# Patient Record
Sex: FEMALE | Race: BLACK OR AFRICAN AMERICAN | NOT HISPANIC OR LATINO | Employment: UNEMPLOYED | URBAN - METROPOLITAN AREA
[De-identification: names, ages, dates, MRNs, and addresses within clinical notes are randomized per-mention and may not be internally consistent; named-entity substitution may affect disease eponyms.]

---
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
## 2022-04-11 ENCOUNTER — HISTORICAL (OUTPATIENT)
Dept: ADMINISTRATIVE | Facility: HOSPITAL | Age: 22
End: 2022-04-11

## 2022-04-25 VITALS
OXYGEN SATURATION: 99 % | SYSTOLIC BLOOD PRESSURE: 144 MMHG | WEIGHT: 158.75 LBS | BODY MASS INDEX: 29.21 KG/M2 | HEIGHT: 62 IN | DIASTOLIC BLOOD PRESSURE: 73 MMHG

## 2023-06-22 ENCOUNTER — HOSPITAL ENCOUNTER (EMERGENCY)
Facility: HOSPITAL | Age: 23
Discharge: HOME | End: 2023-06-22
Attending: INTERNAL MEDICINE
Payer: MEDICAID

## 2023-06-22 VITALS
RESPIRATION RATE: 18 BRPM | SYSTOLIC BLOOD PRESSURE: 112 MMHG | WEIGHT: 184.06 LBS | DIASTOLIC BLOOD PRESSURE: 71 MMHG | HEART RATE: 82 BPM | BODY MASS INDEX: 33.87 KG/M2 | OXYGEN SATURATION: 100 % | HEIGHT: 62 IN | TEMPERATURE: 98 F

## 2023-06-22 DIAGNOSIS — J32.9 SINUSITIS, UNSPECIFIED CHRONICITY, UNSPECIFIED LOCATION: Primary | ICD-10-CM

## 2023-06-22 DIAGNOSIS — R05.9 COUGH: ICD-10-CM

## 2023-06-22 LAB
ALBUMIN SERPL-MCNC: 3.7 G/DL (ref 3.5–5)
ALBUMIN/GLOB SERPL: 1 RATIO (ref 1.1–2)
ALP SERPL-CCNC: 131 UNIT/L (ref 40–150)
ALT SERPL-CCNC: 15 UNIT/L (ref 0–55)
APPEARANCE UR: CLEAR
AST SERPL-CCNC: 13 UNIT/L (ref 5–34)
B-HCG UR QL: NEGATIVE
BACTERIA #/AREA URNS AUTO: ABNORMAL /HPF
BASOPHILS # BLD AUTO: 0.06 X10(3)/MCL
BASOPHILS NFR BLD AUTO: 0.6 %
BILIRUB UR QL STRIP.AUTO: NEGATIVE MG/DL
BILIRUBIN DIRECT+TOT PNL SERPL-MCNC: 0.5 MG/DL
BUN SERPL-MCNC: 10.1 MG/DL (ref 7–18.7)
CALCIUM SERPL-MCNC: 9.8 MG/DL (ref 8.4–10.2)
CHLORIDE SERPL-SCNC: 106 MMOL/L (ref 98–107)
CO2 SERPL-SCNC: 26 MMOL/L (ref 22–29)
COLOR UR: ABNORMAL
CREAT SERPL-MCNC: 0.65 MG/DL (ref 0.55–1.02)
CTP QC/QA: YES
EOSINOPHIL # BLD AUTO: 0.09 X10(3)/MCL (ref 0–0.9)
EOSINOPHIL NFR BLD AUTO: 0.9 %
ERYTHROCYTE [DISTWIDTH] IN BLOOD BY AUTOMATED COUNT: 13.3 % (ref 11.5–17)
GFR SERPLBLD CREATININE-BSD FMLA CKD-EPI: >60 MLS/MIN/1.73/M2
GLOBULIN SER-MCNC: 3.8 GM/DL (ref 2.4–3.5)
GLUCOSE SERPL-MCNC: 97 MG/DL (ref 74–100)
GLUCOSE UR QL STRIP.AUTO: NORMAL MG/DL
HCT VFR BLD AUTO: 37.9 % (ref 37–47)
HGB BLD-MCNC: 12.4 G/DL (ref 12–16)
HYALINE CASTS #/AREA URNS LPF: ABNORMAL /LPF
IMM GRANULOCYTES # BLD AUTO: 0.02 X10(3)/MCL (ref 0–0.04)
IMM GRANULOCYTES NFR BLD AUTO: 0.2 %
KETONES UR QL STRIP.AUTO: NEGATIVE MG/DL
LEUKOCYTE ESTERASE UR QL STRIP.AUTO: NEGATIVE UNIT/L
LYMPHOCYTES # BLD AUTO: 3.23 X10(3)/MCL (ref 0.6–4.6)
LYMPHOCYTES NFR BLD AUTO: 33.3 %
MCH RBC QN AUTO: 28.4 PG (ref 27–31)
MCHC RBC AUTO-ENTMCNC: 32.7 G/DL (ref 33–36)
MCV RBC AUTO: 86.7 FL (ref 80–94)
MONOCYTES # BLD AUTO: 0.9 X10(3)/MCL (ref 0.1–1.3)
MONOCYTES NFR BLD AUTO: 9.3 %
MUCOUS THREADS URNS QL MICRO: ABNORMAL /LPF
NEUTROPHILS # BLD AUTO: 5.41 X10(3)/MCL (ref 2.1–9.2)
NEUTROPHILS NFR BLD AUTO: 55.7 %
NITRITE UR QL STRIP.AUTO: NEGATIVE
NRBC BLD AUTO-RTO: 0 %
PH UR STRIP.AUTO: 6.5 [PH]
PLATELET # BLD AUTO: 325 X10(3)/MCL (ref 130–400)
PMV BLD AUTO: 10.5 FL (ref 7.4–10.4)
POTASSIUM SERPL-SCNC: 3.9 MMOL/L (ref 3.5–5.1)
PROT SERPL-MCNC: 7.5 GM/DL (ref 6.4–8.3)
PROT UR QL STRIP.AUTO: ABNORMAL MG/DL
RBC # BLD AUTO: 4.37 X10(6)/MCL (ref 4.2–5.4)
RBC #/AREA URNS AUTO: ABNORMAL /HPF
RBC UR QL AUTO: NEGATIVE UNIT/L
SODIUM SERPL-SCNC: 141 MMOL/L (ref 136–145)
SP GR UR STRIP.AUTO: 1.03
SQUAMOUS #/AREA URNS LPF: ABNORMAL /HPF
UROBILINOGEN UR STRIP-ACNC: ABNORMAL MG/DL
WBC # SPEC AUTO: 9.71 X10(3)/MCL (ref 4.5–11.5)
WBC #/AREA URNS AUTO: ABNORMAL /HPF

## 2023-06-22 PROCEDURE — 81001 URINALYSIS AUTO W/SCOPE: CPT | Performed by: NURSE PRACTITIONER

## 2023-06-22 PROCEDURE — 81025 URINE PREGNANCY TEST: CPT | Performed by: NURSE PRACTITIONER

## 2023-06-22 PROCEDURE — 99284 EMERGENCY DEPT VISIT MOD MDM: CPT | Mod: 25

## 2023-06-22 PROCEDURE — 85025 COMPLETE CBC W/AUTO DIFF WBC: CPT | Performed by: NURSE PRACTITIONER

## 2023-06-22 PROCEDURE — 80053 COMPREHEN METABOLIC PANEL: CPT | Performed by: NURSE PRACTITIONER

## 2023-06-22 RX ORDER — FLUTICASONE PROPIONATE 50 MCG
1 SPRAY, SUSPENSION (ML) NASAL 2 TIMES DAILY PRN
Qty: 15 G | Refills: 0 | Status: SHIPPED | OUTPATIENT
Start: 2023-06-22

## 2023-06-22 RX ORDER — CETIRIZINE HYDROCHLORIDE 10 MG/1
10 TABLET ORAL DAILY
Qty: 14 TABLET | Refills: 0 | Status: SHIPPED | OUTPATIENT
Start: 2023-06-22 | End: 2023-07-06

## 2023-06-22 RX ORDER — AMOXICILLIN AND CLAVULANATE POTASSIUM 875; 125 MG/1; MG/1
1 TABLET, FILM COATED ORAL 2 TIMES DAILY
Qty: 14 TABLET | Refills: 0 | Status: SHIPPED | OUTPATIENT
Start: 2023-06-22

## 2023-06-22 NOTE — ED PROVIDER NOTES
Encounter Date: 6/22/2023       History     Chief Complaint   Patient presents with    Generalized Body Aches     Headache, chest pain/ burning when swallowing, generalized all over body pain short of breath. AAOx4 NAD respirations even and unlabored.  VS WNL     Pt is a 23 y.o. female who presents to the [facility] ED complaining of sinus pain and fatigue for the last week. Denies weakness, dizziness, fever, abdominal ain, nausea, or vomiting. Slight cough a few days ago with mild rib pain. Reports cough symptoms have since resolved. Denies relief with OTC medications.    Review of patient's allergies indicates:  No Known Allergies  No past medical history on file.  No past surgical history on file.  No family history on file.     Review of Systems   Constitutional:  Negative for chills, diaphoresis, fatigue and fever.   HENT:  Positive for rhinorrhea, sinus pressure and sinus pain. Negative for facial swelling, sore throat and trouble swallowing.    Respiratory:  Negative for cough, chest tightness, shortness of breath and wheezing.    Cardiovascular:  Negative for chest pain, palpitations and leg swelling.   Gastrointestinal:  Negative for abdominal pain, diarrhea, nausea and vomiting.   Genitourinary:  Negative for dysuria, flank pain, frequency, hematuria and urgency.   Musculoskeletal:  Negative for arthralgias, back pain, joint swelling and myalgias.   Skin:  Negative for color change and rash.   Neurological:  Negative for dizziness, syncope, weakness and light-headedness.   Hematological:  Does not bruise/bleed easily.   All other systems reviewed and are negative.    Physical Exam     Initial Vitals [06/22/23 1221]   BP Pulse Resp Temp SpO2   112/71 82 18 98.3 °F (36.8 °C) 100 %      MAP       --         Physical Exam    Nursing note and vitals reviewed.  Constitutional: She appears well-developed and well-nourished.   HENT:   Head: Normocephalic and atraumatic. Head is without raccoon's eyes and without  Urban's sign.   Nose: Mucosal edema and rhinorrhea present. Right sinus exhibits frontal sinus tenderness. Left sinus exhibits frontal sinus tenderness.   Mouth/Throat: Oropharynx is clear and moist.   Erythema to bilateral nares.      Eyes: Conjunctivae and EOM are normal. Pupils are equal, round, and reactive to light.   Neck: Neck supple.   Normal range of motion.  Cardiovascular:  Normal rate, regular rhythm, normal heart sounds and intact distal pulses.           Pulmonary/Chest: Effort normal and breath sounds normal. No respiratory distress. She has no wheezes. She has no rhonchi. She has no rales. She exhibits no tenderness.   Abdominal: Abdomen is soft and flat. Bowel sounds are normal. She exhibits no distension. There is no abdominal tenderness. There is no rebound, no guarding, no tenderness at McBurney's point and negative Palm's sign. negative psoas sign  Musculoskeletal:         General: Normal range of motion.      Cervical back: Normal range of motion and neck supple.     Neurological: She is alert and oriented to person, place, and time. She has normal strength and normal reflexes.   Skin: Skin is warm and dry. Capillary refill takes less than 2 seconds.   Psychiatric: She has a normal mood and affect. Her speech is normal and behavior is normal. Judgment and thought content normal.       ED Course   Procedures  Labs Reviewed   COMPREHENSIVE METABOLIC PANEL - Abnormal; Notable for the following components:       Result Value    Globulin 3.8 (*)     Albumin/Globulin Ratio 1.0 (*)     All other components within normal limits   URINALYSIS, REFLEX TO URINE CULTURE - Abnormal; Notable for the following components:    Protein, UA Trace (*)     Urobilinogen, UA 1+ (*)     Bacteria, UA Trace (*)     Squamous Epithelial Cells, UA Occ (*)     Mucous, UA Occ (*)     All other components within normal limits   CBC WITH DIFFERENTIAL - Abnormal; Notable for the following components:    MCHC 32.7 (*)     MPV  10.5 (*)     All other components within normal limits   CBC W/ AUTO DIFFERENTIAL    Narrative:     The following orders were created for panel order CBC auto differential.  Procedure                               Abnormality         Status                     ---------                               -----------         ------                     CBC with Differential[[id number]]        Abnormal            Final result                 Please view results for these tests on the individual orders.   EXTRA TUBES    Narrative:     The following orders were created for panel order EXTRA TUBES.  Procedure                               Abnormality         Status                     ---------                               -----------         ------                     Light Blue Top Hold[[id number]]                              In process                 Gold Top Hold[[id number]]                                    In process                   Please view results for these tests on the individual orders.   LIGHT BLUE TOP HOLD   GOLD TOP HOLD   POCT URINE PREGNANCY          Imaging Results              X-Ray Chest 1 View (Final result)  Result time 06/22/23 14:27:09      Final result by Thuy Romero MD (06/22/23 14:27:09)                   Impression:      No acute cardiopulmonary abnormality.      Electronically signed by: Thuy Romero  Date:    06/22/2023  Time:    14:27               Narrative:    EXAMINATION:  XR CHEST 1 VIEW    CLINICAL HISTORY:  Cough, unspecified    TECHNIQUE:  Single frontal view of the chest was performed.    COMPARISON:  None    FINDINGS:  LINES AND TUBES: None    MEDIASTINUM AND JADE: The cardiac silhouette is normal.    LUNGS: No lobar consolidation. No edema.    PLEURA:No pleural effusion. No pneumothorax.    OTHER: No acute osseous abnormality.                                       Medications - No data to display  Medical Decision Making:   Differential Diagnosis:    Sinusitis  URI  Pneumonia  Clinical Tests:   Lab Tests: Ordered and Reviewed  Radiological Study: Ordered and Reviewed  ED Management:  2:31 PM Reassessed patient at this time. Reports condition has improved. Discussed with patient all pertinent ED information and results. Discussed diagnosis and treatment plan with patient. Follow up instructions and return to ED instruction have been given. All questions and concerns were addressed at this time. Patient voices understanding of information and instructions. Patient is comfortable with plan and discharge. Patient is stable for discharge.                           Clinical Impression:   Final diagnoses:  [R05.9] Cough  [J32.9] Sinusitis, unspecified chronicity, unspecified location (Primary)        ED Disposition Condition    Discharge Stable          ED Prescriptions       Medication Sig Dispense Start Date End Date Auth. Provider    fluticasone propionate (FLONASE) 50 mcg/actuation nasal spray 1 spray (50 mcg total) by Each Nostril route 2 (two) times daily as needed for Rhinitis. 15 g 6/22/2023 -- VENKATESH Gunter Jr.    cetirizine (ZYRTEC) 10 MG tablet Take 1 tablet (10 mg total) by mouth once daily. for 14 days 14 tablet 6/22/2023 7/6/2023 VENKATESH Gunter Jr.    amoxicillin-clavulanate 875-125mg (AUGMENTIN) 875-125 mg per tablet Take 1 tablet by mouth 2 (two) times daily. 14 tablet 6/22/2023 -- VENKATESH Gunter Jr.          Follow-up Information       Follow up With Specialties Details Why Contact Info    [facility] Dept Emergency Medicine In 3 days As needed, If symptoms worsen [address] [id number] [phone]             VENKATESH Gunter Jr.  06/22/23 0478

## 2025-07-09 ENCOUNTER — OFFICE VISIT (OUTPATIENT)
Dept: URGENT CARE | Facility: CLINIC | Age: 25
End: 2025-07-09
Payer: MEDICAID

## 2025-07-09 VITALS
RESPIRATION RATE: 20 BRPM | SYSTOLIC BLOOD PRESSURE: 122 MMHG | HEART RATE: 82 BPM | OXYGEN SATURATION: 99 % | HEIGHT: 62 IN | BODY MASS INDEX: 36.25 KG/M2 | WEIGHT: 197 LBS | DIASTOLIC BLOOD PRESSURE: 64 MMHG | TEMPERATURE: 98 F

## 2025-07-09 DIAGNOSIS — R10.10 PAIN OF UPPER ABDOMEN: Primary | ICD-10-CM

## 2025-07-09 DIAGNOSIS — R11.0 NAUSEA: ICD-10-CM

## 2025-07-09 LAB
B-HCG UR QL: NEGATIVE
BILIRUB UR QL STRIP: NEGATIVE
CTP QC/QA: YES
CTP QC/QA: YES
GLUCOSE UR QL STRIP: NEGATIVE
KETONES UR QL STRIP: NEGATIVE
LEUKOCYTE ESTERASE UR QL STRIP: NEGATIVE
MOLECULAR STREP A: NEGATIVE
PH, POC UA: 5.5
POC BLOOD, URINE: POSITIVE
POC NITRATES, URINE: NEGATIVE
PROT UR QL STRIP: NEGATIVE
SP GR UR STRIP: 1.02 (ref 1–1.03)
UROBILINOGEN UR STRIP-ACNC: ABNORMAL (ref 0.1–1.1)

## 2025-07-09 PROCEDURE — 87651 STREP A DNA AMP PROBE: CPT | Mod: PBBFAC

## 2025-07-09 PROCEDURE — 81003 URINALYSIS AUTO W/O SCOPE: CPT | Mod: PBBFAC

## 2025-07-09 PROCEDURE — 99214 OFFICE O/P EST MOD 30 MIN: CPT | Mod: PBBFAC

## 2025-07-09 PROCEDURE — 81025 URINE PREGNANCY TEST: CPT | Mod: PBBFAC

## 2025-07-09 RX ORDER — ONDANSETRON 4 MG/1
4 TABLET, ORALLY DISINTEGRATING ORAL EVERY 8 HOURS PRN
Qty: 15 TABLET | Refills: 0 | Status: SHIPPED | OUTPATIENT
Start: 2025-07-09 | End: 2025-07-14

## 2025-07-09 RX ORDER — HYDROCODONE BITARTRATE AND ACETAMINOPHEN 5; 325 MG/1; MG/1
1 TABLET ORAL EVERY 6 HOURS PRN
Qty: 12 TABLET | Refills: 0 | Status: SHIPPED | OUTPATIENT
Start: 2025-07-09 | End: 2025-07-12

## 2025-07-09 RX ORDER — DICYCLOMINE HYDROCHLORIDE 20 MG/1
20 TABLET ORAL 4 TIMES DAILY
COMMUNITY
Start: 2025-07-08

## 2025-07-09 RX ORDER — LOPERAMIDE HYDROCHLORIDE 2 MG/1
CAPSULE ORAL
COMMUNITY
Start: 2025-07-08

## 2025-07-09 NOTE — PROGRESS NOTES
"Subjective:       Patient ID: Xiomara Feldman is a 25 y.o. female.    Vitals:  height is 5' 2" (1.575 m) and weight is 89.4 kg (197 lb). Her oral temperature is 98.2 °F (36.8 °C). Her blood pressure is 122/64 and her pulse is 82. Her respiration is 20 and oxygen saturation is 99%.     Chief Complaint: Nausea (Pt co nausea stomach pain HA  x 4  days  Pt states she was seen at [facility] yesterday and they prescribed her medication for her stomach and HA but little to no relief with medication.)    24 y/o  female complaining of symptoms x 4 days which have worsened, ER workup on yesterday, she has taken Bentyl with no significant improvement.  Patient's last menstrual period was 06/22/2025 (exact date).          Constitution: Negative for fever.   Gastrointestinal:  Positive for abdominal pain, abdominal bloating, nausea and diarrhea. Negative for bright red blood in stool, dark colored stools and bowel incontinence.   Genitourinary:  Positive for ovarian cysts. Negative for dysuria, frequency, irregular menstruation, missed menses and heavy menstrual bleeding.       Objective:      Physical Exam   Constitutional: She is oriented to person, place, and time. She is cooperative. She is easily aroused.  Non-toxic appearance.      Comments:Appears uncomfortable   obesityawake  HENT:   Head: Normocephalic and atraumatic.   Mouth/Throat: Oropharynx is clear and moist and mucous membranes are normal.   Eyes: Lids are normal.   Abdominal: Bowel sounds are normal. She exhibits distension. Soft. There is generalized abdominal tenderness. There is no rebound, no guarding, negative Palm's sign, no left CVA tenderness and no right CVA tenderness.   Neurological: She is alert, oriented to person, place, and time and easily aroused. GCS eye subscore is 4. GCS verbal subscore is 5. GCS motor subscore is 6.   Skin: Skin is warm, dry, intact and not diaphoretic. Capillary refill takes less than 2 " seconds.   Psychiatric: Her speech is normal and behavior is normal.   Nursing note and vitals reviewed.        Assessment:       1. Nausea    2. Pain of upper abdomen          Plan:     Strep test is negative, blood noted in UA, patient reports CT from [facility] shows ovarian cyst, she has taken Bentyl with no significant improvement, she has no establish gyn or PCP.  We will give few days of Norco for moderate/severe pain, discussed when to seek ER treatment, differentials reviewed.    Nausea  -     POCT Urinalysis, Dipstick, Automated, W/O Scope  -     POCT urine pregnancy  -     POCT Strep A, Molecular  -     ondansetron (ZOFRAN-ODT) 4 MG TbDL; Take 1 tablet (4 mg total) by mouth every 8 (eight) hours as needed (nausea).  Dispense: 15 tablet; Refill: 0    Pain of upper abdomen  -     HYDROcodone-acetaminophen (NORCO) 5-325 mg per tablet; Take 1 tablet by mouth every 6 (six) hours as needed for Pain.  Dispense: 12 tablet; Refill: 0           Results for orders placed or performed in visit on 07/09/25   POCT Urinalysis, Dipstick, Automated, W/O Scope    Collection Time: 07/09/25  9:37 AM   Result Value Ref Range    POC Blood, Urine Positive (A) Negative    POC Bilirubin, Urine Negative Negative    POC Urobilinogen, Urine 2.0 e.u./dl 0.1 - 1.1    POC Ketones, Urine Negative Negative    POC Protein, Urine Negative Negative    POC Nitrates, Urine Negative Negative    POC Glucose, Urine Negative Negative    pH, UA 5.5     POC Specific Gravity, Urine 1.020 1.003 - 1.029    POC Leukocytes, Urine Negative Negative   POCT urine pregnancy    Collection Time: 07/09/25  9:43 AM   Result Value Ref Range    POC Preg Test, Ur Negative Negative     Acceptable Yes    POCT Strep A, Molecular    Collection Time: 07/09/25  9:47 AM   Result Value Ref Range    Molecular Strep A, POC Negative Negative     Acceptable Yes         Medical Decision Making:   Differential Diagnosis:   Viral gastroenteritis, ruptured  ovarian cyst, constipation, diverticulosis, amongst others.